# Patient Record
Sex: FEMALE | Race: OTHER | NOT HISPANIC OR LATINO
[De-identification: names, ages, dates, MRNs, and addresses within clinical notes are randomized per-mention and may not be internally consistent; named-entity substitution may affect disease eponyms.]

---

## 2017-05-18 ENCOUNTER — RESULT REVIEW (OUTPATIENT)
Age: 42
End: 2017-05-18

## 2018-03-24 ENCOUNTER — EMERGENCY (EMERGENCY)
Facility: HOSPITAL | Age: 43
LOS: 1 days | Discharge: HOME | End: 2018-03-24
Attending: EMERGENCY MEDICINE | Admitting: EMERGENCY MEDICINE
Payer: COMMERCIAL

## 2018-03-24 VITALS
OXYGEN SATURATION: 100 % | HEART RATE: 64 BPM | TEMPERATURE: 98 F | DIASTOLIC BLOOD PRESSURE: 72 MMHG | RESPIRATION RATE: 12 BRPM | SYSTOLIC BLOOD PRESSURE: 110 MMHG

## 2018-03-24 VITALS
TEMPERATURE: 98 F | HEART RATE: 72 BPM | DIASTOLIC BLOOD PRESSURE: 84 MMHG | OXYGEN SATURATION: 99 % | RESPIRATION RATE: 18 BRPM | WEIGHT: 154.98 LBS | HEIGHT: 67 IN | SYSTOLIC BLOOD PRESSURE: 117 MMHG

## 2018-03-24 LAB
ALBUMIN SERPL ELPH-MCNC: 4.7 G/DL — SIGNIFICANT CHANGE UP (ref 3.3–5)
ALP SERPL-CCNC: 70 U/L — SIGNIFICANT CHANGE UP (ref 40–120)
ALT FLD-CCNC: 18 U/L RC — SIGNIFICANT CHANGE UP (ref 10–45)
ANION GAP SERPL CALC-SCNC: 12 MMOL/L — SIGNIFICANT CHANGE UP (ref 5–17)
APTT BLD: 33.4 SEC — SIGNIFICANT CHANGE UP (ref 27.5–37.4)
AST SERPL-CCNC: 21 U/L — SIGNIFICANT CHANGE UP (ref 10–40)
BASE EXCESS BLDV CALC-SCNC: 1.3 MMOL/L — SIGNIFICANT CHANGE UP (ref -2–2)
BASOPHILS # BLD AUTO: 0 K/UL — SIGNIFICANT CHANGE UP (ref 0–0.2)
BASOPHILS NFR BLD AUTO: 0.3 % — SIGNIFICANT CHANGE UP (ref 0–2)
BILIRUB SERPL-MCNC: 0.3 MG/DL — SIGNIFICANT CHANGE UP (ref 0.2–1.2)
BUN SERPL-MCNC: 12 MG/DL — SIGNIFICANT CHANGE UP (ref 7–23)
CA-I SERPL-SCNC: 1.27 MMOL/L — SIGNIFICANT CHANGE UP (ref 1.12–1.3)
CALCIUM SERPL-MCNC: 10.2 MG/DL — SIGNIFICANT CHANGE UP (ref 8.4–10.5)
CHLORIDE BLDV-SCNC: 106 MMOL/L — SIGNIFICANT CHANGE UP (ref 96–108)
CHLORIDE SERPL-SCNC: 103 MMOL/L — SIGNIFICANT CHANGE UP (ref 96–108)
CK MB CFR SERPL CALC: 1.3 NG/ML — SIGNIFICANT CHANGE UP (ref 0–3.8)
CK SERPL-CCNC: 56 U/L — SIGNIFICANT CHANGE UP (ref 25–170)
CO2 BLDV-SCNC: 31 MMOL/L — HIGH (ref 22–30)
CO2 SERPL-SCNC: 27 MMOL/L — SIGNIFICANT CHANGE UP (ref 22–31)
CREAT SERPL-MCNC: 0.8 MG/DL — SIGNIFICANT CHANGE UP (ref 0.5–1.3)
D DIMER BLD IA.RAPID-MCNC: <150 NG/ML DDU — SIGNIFICANT CHANGE UP
EOSINOPHIL # BLD AUTO: 0.1 K/UL — SIGNIFICANT CHANGE UP (ref 0–0.5)
EOSINOPHIL NFR BLD AUTO: 1.2 % — SIGNIFICANT CHANGE UP (ref 0–6)
GAS PNL BLDV: 142 MMOL/L — SIGNIFICANT CHANGE UP (ref 136–145)
GAS PNL BLDV: SIGNIFICANT CHANGE UP
GLUCOSE BLDV-MCNC: 87 MG/DL — SIGNIFICANT CHANGE UP (ref 70–99)
GLUCOSE SERPL-MCNC: 90 MG/DL — SIGNIFICANT CHANGE UP (ref 70–99)
HCO3 BLDV-SCNC: 30 MMOL/L — HIGH (ref 21–29)
HCT VFR BLD CALC: 40.2 % — SIGNIFICANT CHANGE UP (ref 34.5–45)
HCT VFR BLDA CALC: 54 % — HIGH (ref 39–50)
HGB BLD CALC-MCNC: 17.8 G/DL — HIGH (ref 11.5–15.5)
HGB BLD-MCNC: 13.9 G/DL — SIGNIFICANT CHANGE UP (ref 11.5–15.5)
INR BLD: 1.05 RATIO — SIGNIFICANT CHANGE UP (ref 0.88–1.16)
LACTATE BLDV-MCNC: 2.2 MMOL/L — HIGH (ref 0.7–2)
LYMPHOCYTES # BLD AUTO: 1.7 K/UL — SIGNIFICANT CHANGE UP (ref 1–3.3)
LYMPHOCYTES # BLD AUTO: 36.1 % — SIGNIFICANT CHANGE UP (ref 13–44)
MCHC RBC-ENTMCNC: 30.2 PG — SIGNIFICANT CHANGE UP (ref 27–34)
MCHC RBC-ENTMCNC: 34.5 GM/DL — SIGNIFICANT CHANGE UP (ref 32–36)
MCV RBC AUTO: 87.5 FL — SIGNIFICANT CHANGE UP (ref 80–100)
MONOCYTES # BLD AUTO: 0.4 K/UL — SIGNIFICANT CHANGE UP (ref 0–0.9)
MONOCYTES NFR BLD AUTO: 8.8 % — SIGNIFICANT CHANGE UP (ref 2–14)
NEUTROPHILS # BLD AUTO: 2.6 K/UL — SIGNIFICANT CHANGE UP (ref 1.8–7.4)
NEUTROPHILS NFR BLD AUTO: 53.6 % — SIGNIFICANT CHANGE UP (ref 43–77)
NT-PROBNP SERPL-SCNC: 74 PG/ML — SIGNIFICANT CHANGE UP (ref 0–300)
PCO2 BLDV: 61 MMHG — HIGH (ref 35–50)
PH BLDV: 7.3 — LOW (ref 7.35–7.45)
PLATELET # BLD AUTO: 200 K/UL — SIGNIFICANT CHANGE UP (ref 150–400)
PO2 BLDV: 26 MMHG — SIGNIFICANT CHANGE UP (ref 25–45)
POTASSIUM BLDV-SCNC: 3.6 MMOL/L — SIGNIFICANT CHANGE UP (ref 3.5–5)
POTASSIUM SERPL-MCNC: 3.7 MMOL/L — SIGNIFICANT CHANGE UP (ref 3.5–5.3)
POTASSIUM SERPL-SCNC: 3.7 MMOL/L — SIGNIFICANT CHANGE UP (ref 3.5–5.3)
PROT SERPL-MCNC: 8 G/DL — SIGNIFICANT CHANGE UP (ref 6–8.3)
PROTHROM AB SERPL-ACNC: 11.5 SEC — SIGNIFICANT CHANGE UP (ref 9.8–12.7)
RBC # BLD: 4.6 M/UL — SIGNIFICANT CHANGE UP (ref 3.8–5.2)
RBC # FLD: 11.1 % — SIGNIFICANT CHANGE UP (ref 10.3–14.5)
SAO2 % BLDV: 41 % — LOW (ref 67–88)
SODIUM SERPL-SCNC: 142 MMOL/L — SIGNIFICANT CHANGE UP (ref 135–145)
TROPONIN T SERPL-MCNC: <0.01 NG/ML — SIGNIFICANT CHANGE UP (ref 0–0.06)
WBC # BLD: 4.8 K/UL — SIGNIFICANT CHANGE UP (ref 3.8–10.5)
WBC # FLD AUTO: 4.8 K/UL — SIGNIFICANT CHANGE UP (ref 3.8–10.5)

## 2018-03-24 PROCEDURE — 82803 BLOOD GASES ANY COMBINATION: CPT

## 2018-03-24 PROCEDURE — 85027 COMPLETE CBC AUTOMATED: CPT

## 2018-03-24 PROCEDURE — 82553 CREATINE MB FRACTION: CPT

## 2018-03-24 PROCEDURE — 82435 ASSAY OF BLOOD CHLORIDE: CPT

## 2018-03-24 PROCEDURE — 84484 ASSAY OF TROPONIN QUANT: CPT

## 2018-03-24 PROCEDURE — 82947 ASSAY GLUCOSE BLOOD QUANT: CPT

## 2018-03-24 PROCEDURE — 93010 ELECTROCARDIOGRAM REPORT: CPT

## 2018-03-24 PROCEDURE — 99284 EMERGENCY DEPT VISIT MOD MDM: CPT

## 2018-03-24 PROCEDURE — 99284 EMERGENCY DEPT VISIT MOD MDM: CPT | Mod: 25

## 2018-03-24 PROCEDURE — 85014 HEMATOCRIT: CPT

## 2018-03-24 PROCEDURE — 85379 FIBRIN DEGRADATION QUANT: CPT

## 2018-03-24 PROCEDURE — 85610 PROTHROMBIN TIME: CPT

## 2018-03-24 PROCEDURE — 80053 COMPREHEN METABOLIC PANEL: CPT

## 2018-03-24 PROCEDURE — 85730 THROMBOPLASTIN TIME PARTIAL: CPT

## 2018-03-24 PROCEDURE — 93005 ELECTROCARDIOGRAM TRACING: CPT

## 2018-03-24 PROCEDURE — 83880 ASSAY OF NATRIURETIC PEPTIDE: CPT

## 2018-03-24 PROCEDURE — 83605 ASSAY OF LACTIC ACID: CPT

## 2018-03-24 PROCEDURE — 82550 ASSAY OF CK (CPK): CPT

## 2018-03-24 PROCEDURE — 84295 ASSAY OF SERUM SODIUM: CPT

## 2018-03-24 PROCEDURE — 82330 ASSAY OF CALCIUM: CPT

## 2018-03-24 PROCEDURE — 71275 CT ANGIOGRAPHY CHEST: CPT | Mod: 26

## 2018-03-24 PROCEDURE — 71275 CT ANGIOGRAPHY CHEST: CPT

## 2018-03-24 PROCEDURE — 84132 ASSAY OF SERUM POTASSIUM: CPT

## 2018-03-24 RX ORDER — SODIUM CHLORIDE 9 MG/ML
1000 INJECTION INTRAMUSCULAR; INTRAVENOUS; SUBCUTANEOUS ONCE
Qty: 0 | Refills: 0 | Status: COMPLETED | OUTPATIENT
Start: 2018-03-24 | End: 2018-03-24

## 2018-03-24 RX ADMIN — SODIUM CHLORIDE 1000 MILLILITER(S): 9 INJECTION INTRAMUSCULAR; INTRAVENOUS; SUBCUTANEOUS at 15:57

## 2018-03-24 NOTE — ED PROVIDER NOTE - PLAN OF CARE
43 yo F with h/o of lymphoma in past presents for worsening LANDERS x 1 week, in setting of past chemotherapy and cancer, concern for cardiac etiology and also suspicion for PE, will check full set of labs, EKG, CTA.

## 2018-03-24 NOTE — ED PROVIDER NOTE - DIAGNOSIS COUNSELING, MDM
conducted a detailed discussion... I reviewed all lab and imaging results from this ED visit, and discussed ALL results with the patient, including all abnormal results and incidental findings. I recommened appropriate follow up for all incidental findings. The patient expressed understanding of all results discussed and follow up instructions given.  I had a detailed discussion with the patient and/or guardian regarding the historical points, exam findings, and any diagnostic results supporting the discharge/admit diagnosis.

## 2018-03-24 NOTE — ED PROVIDER NOTE - CARE PLAN
Principal Discharge DX:	Dyspnea on exertion  Assessment and plan of treatment:	43 yo F with h/o of lymphoma in past presents for worsening LANDERS x 1 week, in setting of past chemotherapy and cancer, concern for cardiac etiology and also suspicion for PE, will check full set of labs, EKG, CTA.

## 2018-03-24 NOTE — ED PROVIDER NOTE - OBJECTIVE STATEMENT
43 yo F with PMHx of Non-Hodgkin's lymphoma currently on remission (last tx in 2011, no active signs of lymphoma at this time) presents for worsening LANDERS for the past week, that has been worsening. Patient reports she said that today AM she was walking up a flight of stairs when she felt her heart was racing and palpitations that has not happened before. She also reports that she had an episode of tightness in her chest that also happened with this SOB. She said that these symptoms are similar to the last time she was diagnosed with lymphoma. She has no dyspnea at rest, no fevers, no chills, no orthopnea, no LE swelling. She said that she has been having worsening hot flashes that has increased in intensity, as usually the night sweats come and go. ROS positive for night sweats, intentional weight loss (has lost 20lbs over the past 6 weeks).     Oncologist: Dr. Cong Carrillo 43 yo F with PMHx of Non-Hodgkin's lymphoma currently on remission (last tx in 2011, no active signs of lymphoma at this time) presents for worsening LANDERS for the past week, that has been worsening. Patient reports she said that today AM she was walking up a flight of stairs when she felt her heart was racing and palpitations that has not happened before. She also reports that she had an episode of tightness in her chest that also happened with this SOB. She said that these symptoms are similar to the last time she was diagnosed with lymphoma. She has no dyspnea at rest, no fevers, no chills, no orthopnea, no LE swelling. She said that she has been having worsening hot flashes that has increased in intensity, as usually the night sweats come and go. ROS positive for night sweats, intentional weight loss (has lost 20lbs over the past 6 weeks).     PMD: Dr. Corazon Quevedo, [phone]  Oncologist: Dr. Cong Carrillo 41 yo F with PMHx of Non-Hodgkin's lymphoma currently on remission (last tx in 2011, no active signs of lymphoma at this time) presents for worsening LANDERS for the past week.  Patient reports she said that today AM she was walking up a flight of stairs when she felt her heart was racing and palpitations that has not happened before. She also reports that she had an episode of tightness in her chest that also happened with this SOB. She said that these symptoms are similar to the last time she was diagnosed with lymphoma. She has no dyspnea at rest, no fevers, no chills, no orthopnea, no LE swelling. She said that she has been having worsening hot flashes that has increased in intensity, as usually the night sweats come and go. ROS positive for night sweats, intentional weight loss (has lost 20lbs over the past 6 weeks).     PMD: Dr. Corazon Quevedo, [phone]  Oncologist: Dr. Cong Carrillo

## 2018-03-24 NOTE — ED PROVIDER NOTE - ATTENDING CONTRIBUTION TO CARE
41 yo F h/o NH lymphoma in remission, presents with 1 week of exertional dyspnea and chest tightness. no complaints at this time. she said when she worked out she felt the symptoms. also states that when she walked up the stairs today she felt palpitations, with no cp/sob. she states that symptoms remind her of when she got her lymphoma diagnosis. reports night sweats. no f/ch, uri symptoms, cough, leg swelling.   PE lungs CTA. no resp distress. no abdominal TTP, no leg swelling. 41 yo F h/o NH lymphoma in remission, presents with 1 week of exertional dyspnea and chest tightness. no complaints at this time. she said when she worked out she felt the symptoms. also states that when she walked up the stairs today she felt palpitations, with no cp/sob. she states that symptoms remind her of when she got her lymphoma diagnosis. reports night sweats. no f/ch, uri symptoms, cough, leg swelling. no known ACS risk factors. nonsmoker, no FH of cardiac disease. no previous h/o PE  PE lungs CTA. no resp distress. no abdominal TTP, no leg swelling.  ed workup shows EKG with no ischemic changes. CTA chest with no evidence of PE. there are few incidental findings which I dsicussed with pt and recommended outpt f/u. labs ar normal, including negative trop x2.    I discussed with pt need for echo and stress test and recommended admission to the hospital for further testing however pt prefferred to be discharged from the ER. I had pt ambulate aroudn the ER and she did so without any feelings of sob and stable vital signs. if patient prefers to be discharged to pursue outpatient f/u, I do believe this is reasonable as she is asymptomatic at this time, low risk for ACS with a heart score of 1, and no evidence of ACS on today's workup. therefore, at her request pt wwas discharged with instructions to rest , aspirin daily, and f/u with cards in 1-2 dyas for repeat eval/further management. I gave pt an urgent refferal to [facility] clinic to be seen within 48 hours for stress test and echo.     The patient was discharged from the ED in stable condition. All results of today's workup were discussed with the patient and all questions/concerns were addressed. All discharge instructions were thoroughly discussed with the patient, as well as important warning signs and new/ worsening symptoms which should necessitate patient's immediate return to the ED. The patient is agreeable with discharge and expresses full understanding of all instructions given.

## 2018-03-24 NOTE — ED ADULT NURSE NOTE - OBJECTIVE STATEMENT
43 y/o female A&Ox4, PMH lymphoma, presents to ED with c/o SOB/dyspnea upon exertion/palpitations x1 week. Pt states she began noticing symptoms after going to the gym, which included extreme fatigue after the gym. Pt states she's noticed an increase in hot flashes and night sweats "similar to when she had lymphoma but worse" as pt states. Upon further assessment, airway patent and intact, denies chest pain/SOB. ABD soft nontender, denies n/v/d. Denies blood in urine and/or stool. Skin intact, pt noted rash present but self resolving on right wrist and upper back and left thumb. Peripheral pulses strong and normal baseline sensation present x4. Safety and comfort measures maintained.

## 2018-03-24 NOTE — ED PROVIDER NOTE - PROGRESS NOTE DETAILS
MD Ethan: CTA negative for PE, cardiac enzymes x 1 negative. Discussed with patient's oncologist Dr. Carrillo who reported that he does not think the chemotherapy will cause significant ACS or ischemic event. Discussed with family results and patient and family agreeable to discharge home with close PMD follow up. MD Ethan: CTA negative for PE, cardiac enzymes x 1 negative. Discussed with patient's oncologist Dr. Carrillo who reported that he does not think the chemotherapy will cause significant ACS or ischemic event. Discussed with family results and patient and family agreeable to discharge home with close PMD follow up. Will give patient cardiology referral so patient will have echo done outpatient within 2-3 days.

## 2018-04-02 ENCOUNTER — APPOINTMENT (OUTPATIENT)
Dept: CARDIOLOGY | Facility: CLINIC | Age: 43
End: 2018-04-02
Payer: COMMERCIAL

## 2018-04-02 ENCOUNTER — APPOINTMENT (OUTPATIENT)
Dept: CARDIOLOGY | Facility: CLINIC | Age: 43
End: 2018-04-02

## 2018-04-02 ENCOUNTER — NON-APPOINTMENT (OUTPATIENT)
Age: 43
End: 2018-04-02

## 2018-04-02 VITALS
OXYGEN SATURATION: 100 % | HEIGHT: 66 IN | HEART RATE: 73 BPM | DIASTOLIC BLOOD PRESSURE: 81 MMHG | SYSTOLIC BLOOD PRESSURE: 116 MMHG | WEIGHT: 156 LBS | BODY MASS INDEX: 25.07 KG/M2

## 2018-04-02 VITALS
HEART RATE: 73 BPM | WEIGHT: 156 LBS | OXYGEN SATURATION: 100 % | SYSTOLIC BLOOD PRESSURE: 116 MMHG | BODY MASS INDEX: 25.07 KG/M2 | HEIGHT: 66 IN | DIASTOLIC BLOOD PRESSURE: 81 MMHG

## 2018-04-02 DIAGNOSIS — Z85.79 PERSONAL HISTORY OF OTHER MALIGNANT NEOPLASMS OF LYMPHOID, HEMATOPOIETIC AND RELATED TISSUES: ICD-10-CM

## 2018-04-02 DIAGNOSIS — R07.89 OTHER CHEST PAIN: ICD-10-CM

## 2018-04-02 PROCEDURE — 99204 OFFICE O/P NEW MOD 45 MIN: CPT

## 2018-04-02 PROCEDURE — 93000 ELECTROCARDIOGRAM COMPLETE: CPT

## 2018-05-11 ENCOUNTER — APPOINTMENT (OUTPATIENT)
Dept: CV DIAGNOSITCS | Facility: HOSPITAL | Age: 43
End: 2018-05-11

## 2018-05-11 ENCOUNTER — OUTPATIENT (OUTPATIENT)
Dept: OUTPATIENT SERVICES | Facility: HOSPITAL | Age: 43
LOS: 1 days | End: 2018-05-11
Payer: COMMERCIAL

## 2018-05-11 DIAGNOSIS — R07.89 OTHER CHEST PAIN: ICD-10-CM

## 2018-05-11 PROCEDURE — 93325 DOPPLER ECHO COLOR FLOW MAPG: CPT

## 2018-05-11 PROCEDURE — 93351 STRESS TTE COMPLETE: CPT

## 2018-05-11 PROCEDURE — 93350 STRESS TTE ONLY: CPT | Mod: 26

## 2018-05-11 PROCEDURE — 93320 DOPPLER ECHO COMPLETE: CPT

## 2018-05-11 PROCEDURE — 93320 DOPPLER ECHO COMPLETE: CPT | Mod: 26

## 2018-05-11 PROCEDURE — 93325 DOPPLER ECHO COLOR FLOW MAPG: CPT | Mod: 26

## 2018-07-11 ENCOUNTER — RESULT REVIEW (OUTPATIENT)
Age: 43
End: 2018-07-11

## 2021-12-04 NOTE — ED PROVIDER NOTE - CONSTITUTIONAL, MLM
normal... Well appearing, well nourished, awake, alert, oriented to person, place, time/situation and in no apparent distress. Implemented All Universal Safety Interventions:  [place] to call system. Call bell, personal items and telephone within reach. Instruct patient to call for assistance. Room bathroom lighting operational. Non-slip footwear when patient is off stretcher. Physically safe environment: no spills, clutter or unnecessary equipment. Stretcher in lowest position, wheels locked, appropriate side rails in place.

## 2024-08-22 NOTE — ED PROVIDER NOTE - ENMT NEGATIVE STATEMENT, MLM
August 22, 2024     Patient: Kirt Aguirre   YOB: 1984   Date of Visit: 8/22/2024       To Whom it May Concern:    Kirt Aguirre was seen in my clinic on 8/22/2024 at 1:35 pm.    Please excuse Kirt for her absence from work on the date 8/23/2024.    Sincerely,         Sara Carbone MD    Medical information is confidential and cannot be disclosed without the written consent of the patient or her representative.       Ears: no ear pain and no hearing problems.Nose: no nasal congestion and no nasal drainage.Mouth/Throat: no dysphagia, no hoarseness and no throat pain.Neck: no lumps, no pain, no stiffness and no swollen glands.